# Patient Record
Sex: FEMALE | Race: WHITE
[De-identification: names, ages, dates, MRNs, and addresses within clinical notes are randomized per-mention and may not be internally consistent; named-entity substitution may affect disease eponyms.]

---

## 2018-10-08 ENCOUNTER — HOSPITAL ENCOUNTER (OUTPATIENT)
Dept: HOSPITAL 84 - D.LDO | Age: 23
Discharge: HOME | End: 2018-10-08
Attending: OBSTETRICS & GYNECOLOGY
Payer: MEDICAID

## 2018-10-08 VITALS — BODY MASS INDEX: 39.7 KG/M2

## 2018-10-08 DIAGNOSIS — Z3A.24: ICD-10-CM

## 2018-10-08 DIAGNOSIS — O21.9: Primary | ICD-10-CM

## 2018-10-08 LAB
APPEARANCE UR: CLEAR
BILIRUB SERPL-MCNC: NEGATIVE MG/DL
COLOR UR: YELLOW
GLUCOSE SERPL-MCNC: NEGATIVE MG/DL
KETONES UR STRIP-MCNC: NEGATIVE MG/DL
NITRITE UR-MCNC: NEGATIVE MG/ML
PH UR STRIP: 7 [PH] (ref 5–6)
PROT UR-MCNC: NEGATIVE MG/DL
SP GR UR STRIP: 1.01 (ref 1–1.02)
UROBILINOGEN UR-MCNC: NORMAL MG/DL

## 2019-01-14 ENCOUNTER — HOSPITAL ENCOUNTER (INPATIENT)
Dept: HOSPITAL 84 - D.LD | Age: 24
LOS: 2 days | Discharge: HOME | End: 2019-01-16
Attending: OBSTETRICS & GYNECOLOGY | Admitting: OBSTETRICS & GYNECOLOGY
Payer: COMMERCIAL

## 2019-01-14 VITALS — SYSTOLIC BLOOD PRESSURE: 111 MMHG | DIASTOLIC BLOOD PRESSURE: 79 MMHG

## 2019-01-14 VITALS — DIASTOLIC BLOOD PRESSURE: 57 MMHG | SYSTOLIC BLOOD PRESSURE: 98 MMHG

## 2019-01-14 VITALS
BODY MASS INDEX: 41.62 KG/M2 | WEIGHT: 220.46 LBS | HEIGHT: 61 IN | HEIGHT: 61 IN | BODY MASS INDEX: 41.62 KG/M2 | WEIGHT: 220.46 LBS

## 2019-01-14 VITALS — SYSTOLIC BLOOD PRESSURE: 115 MMHG | DIASTOLIC BLOOD PRESSURE: 70 MMHG

## 2019-01-14 VITALS — DIASTOLIC BLOOD PRESSURE: 63 MMHG | SYSTOLIC BLOOD PRESSURE: 113 MMHG

## 2019-01-14 VITALS — DIASTOLIC BLOOD PRESSURE: 57 MMHG | SYSTOLIC BLOOD PRESSURE: 109 MMHG

## 2019-01-14 VITALS — SYSTOLIC BLOOD PRESSURE: 115 MMHG | DIASTOLIC BLOOD PRESSURE: 55 MMHG

## 2019-01-14 VITALS — DIASTOLIC BLOOD PRESSURE: 64 MMHG | SYSTOLIC BLOOD PRESSURE: 105 MMHG

## 2019-01-14 VITALS — DIASTOLIC BLOOD PRESSURE: 74 MMHG | SYSTOLIC BLOOD PRESSURE: 117 MMHG

## 2019-01-14 VITALS — DIASTOLIC BLOOD PRESSURE: 75 MMHG | SYSTOLIC BLOOD PRESSURE: 118 MMHG

## 2019-01-14 VITALS — SYSTOLIC BLOOD PRESSURE: 114 MMHG | DIASTOLIC BLOOD PRESSURE: 55 MMHG

## 2019-01-14 VITALS — DIASTOLIC BLOOD PRESSURE: 73 MMHG | SYSTOLIC BLOOD PRESSURE: 115 MMHG

## 2019-01-14 VITALS — DIASTOLIC BLOOD PRESSURE: 65 MMHG | SYSTOLIC BLOOD PRESSURE: 117 MMHG

## 2019-01-14 DIAGNOSIS — J45.909: ICD-10-CM

## 2019-01-14 DIAGNOSIS — Z3A.39: ICD-10-CM

## 2019-01-14 DIAGNOSIS — O34.211: ICD-10-CM

## 2019-01-14 LAB
APPEARANCE UR: (no result)
BACTERIA #/AREA URNS HPF: (no result) /HPF
BASOPHILS NFR BLD AUTO: 0.1 % (ref 0–2)
BILIRUB SERPL-MCNC: NEGATIVE MG/DL
COLOR UR: YELLOW
EOSINOPHIL NFR BLD: 0.1 % (ref 0–7)
ERYTHROCYTE [DISTWIDTH] IN BLOOD BY AUTOMATED COUNT: 13.4 % (ref 11.5–14.5)
ERYTHROCYTE [DISTWIDTH] IN BLOOD BY AUTOMATED COUNT: 13.7 % (ref 11.5–14.5)
GLUCOSE SERPL-MCNC: NEGATIVE MG/DL
HCT VFR BLD CALC: 33.2 % (ref 36–48)
HCT VFR BLD CALC: 36.3 % (ref 36–48)
HGB BLD-MCNC: 11 G/DL (ref 12–16)
HGB BLD-MCNC: 11.9 G/DL (ref 12–16)
IMM GRANULOCYTES NFR BLD: 0.3 % (ref 0–5)
KETONES UR STRIP-MCNC: NEGATIVE MG/DL
LYMPHOCYTES NFR BLD AUTO: 9.1 % (ref 15–50)
MCH RBC QN AUTO: 27 PG (ref 26–34)
MCH RBC QN AUTO: 27.2 PG (ref 26–34)
MCHC RBC AUTO-ENTMCNC: 32.8 G/DL (ref 31–37)
MCHC RBC AUTO-ENTMCNC: 33.1 G/DL (ref 31–37)
MCV RBC: 82.2 FL (ref 80–100)
MCV RBC: 82.5 FL (ref 80–100)
MONOCYTES NFR BLD: 4.6 % (ref 2–11)
MUCOUS THREADS #/AREA URNS LPF: (no result) /LPF
NEUTROPHILS NFR BLD AUTO: 85.8 % (ref 40–80)
NITRITE UR-MCNC: NEGATIVE MG/ML
PH UR STRIP: 6 [PH] (ref 5–6)
PLATELET # BLD: 210 10X3/UL (ref 130–400)
PLATELET # BLD: 237 10X3/UL (ref 130–400)
PMV BLD AUTO: 10.3 FL (ref 7.4–10.4)
PMV BLD AUTO: 10.7 FL (ref 7.4–10.4)
PROT UR-MCNC: NEGATIVE MG/DL
RBC # BLD AUTO: 4.04 10X6/UL (ref 4–5.4)
RBC # BLD AUTO: 4.4 10X6/UL (ref 4–5.4)
SP GR UR STRIP: 1.01 (ref 1–1.02)
SQUAMOUS #/AREA URNS HPF: (no result) /HPF (ref 0–5)
UROBILINOGEN UR-MCNC: NORMAL MG/DL
WBC # BLD AUTO: 10.9 10X3/UL (ref 4.8–10.8)
WBC # BLD AUTO: 15.7 10X3/UL (ref 4.8–10.8)
WBC #/AREA URNS HPF: (no result) /HPF (ref 0–5)

## 2019-01-14 PROCEDURE — 0TNB0ZZ RELEASE BLADDER, OPEN APPROACH: ICD-10-PCS | Performed by: OBSTETRICS & GYNECOLOGY

## 2019-01-14 PROCEDURE — 0UB70ZZ EXCISION OF BILATERAL FALLOPIAN TUBES, OPEN APPROACH: ICD-10-PCS | Performed by: OBSTETRICS & GYNECOLOGY

## 2019-01-14 NOTE — NUR
FUNDUS FIRMS IMMEDIATELY WITH MASSAGE, ML, U/U. SMALL LOCHIA RUBRA TO PAD. PT
BREASTFEEDING INFANT AT THIS TIME. FAMILY AT BEDSIDE. SRUx2, CL IN REACH.

## 2019-01-14 NOTE — NUR
PT RECEIVED FROM RECOVERY VIA BED TO ROOM 1274. PT AAOx3, RATING PAIN APPROX
5/10 AT THIS TIME. FUNDUS FIRMS WITH MASSAGE, ML, U/1. SMALL RUBRA LOCHIA, NO
CLOTS. ABD DRESSING OVER LT C/S INCISION IS C/D/I. NO DRAINAGE NOTED. ICE PACK
TO INCISION. ICE CHIPS PROVIDED TO PT PER REQUEST. WILL ADMIN TORADOL AND SET
UP PCA AS ORDERED.

## 2019-01-14 NOTE — NUR
THIS RN TO ROOM FOR PT CHECK. PT REQUESTS ICE CHIPS, ICE CHIPS GIVEN. PT
VISITING WITH FAMILY MEMBER. LIGHTS IN ROOM DIM. PT DENIES FURTHER NEEDS.
SRUx2, CL AND PCA BUTTON IN REACH.

## 2019-01-14 NOTE — NUR
PT VISITING WITH FRIENDS AND FAMILY, RATES PAIN AT 3/10, NEW BAG OF NS WITH
20UNITS OF PITOCIN INFUSING VIA PUMP PER ORDERS.

## 2019-01-14 NOTE — NUR
FUNDUS IMMEDIATELY FIRMS WITH GENTLE MASSAGE, ML, U/1. SMALL RUBRA LOCHIA, 1
NICKEL-SIZED CLOT EXPELLED WITH MASSAGE. PERICARE DONE, PADS CHANGED. PT DAVIDSON
WELL. PT POSITIONED TO LEFT TILT. SPOUSE AT BEDSIDE. SRUx2, CL AND PCA IN
REACH.

## 2019-01-14 NOTE — NUR
ASSISTED WITH BRA CHANGE PER REQUEST, VSS, AFEBRILE, RESP EVEN AND UNLABORED,
PIV INFUSING TO RIGHT FA WITHOUT DIFFICULTY, PERICARE/PATEL CARE COMPLETED,
PERIPAD CHANGED, MODERATE LOCHIA RUBRA, FUNDUS FIRM AT U/1 AND MIDLINE, PATEL
WITH 200ML URINE EMPTIED FROM GRAVITY COLLECTION CHAMBER, TOLERATING PO
FLUIDS, DENIES OTHER NEEDS AT THIS TIME, CONTINUE TO MONITOR.  CALL LIGHT IN
EASY REACH, SPOUSE AT BS AND SUPPORTIVE OF PT.

## 2019-01-14 NOTE — NUR
PAIN REASSESSMENT COMPLETED, PT C/O PAIN NOW A 2 OUT OF 10 ON NUMERIC PAIN
SCALE, USING PCA PRN, DENIES OTHER NEEDS AT THIS TIME, CONTINUE TO MONITOR.

## 2019-01-14 NOTE — NUR
TORADOL ADMIN IVP AT 1303, DILAUDID PCA SETUP AT 1309 AS ORDERED, SEE EMAR FOR
DOC. PT AND FAMILY GIVEN INSTRUCTIONS ON PCA USE AND ONLY PT MAY PRESS PCA
BUTTON. UNDERSTANDING VERBALIZED. PT DENIES NEEDS. WILL REASSESS PAIN.

## 2019-01-14 NOTE — NUR
PT SITTING UP IN BED, COMPLETING PAPERWORK FOR INFANT PER NURSERY REQUEST,
ADDITIONAL LINENS PROVIDED FOR SPOUSE WHO IS STAYING WITH PT TONIGHT.  CALL
LIGHT IN EASY REACH, DENIES NEEDS, CONTINUE TO MONITOR.

## 2019-01-14 NOTE — NUR
THIS RN TO ROOM FOR PT CHECK. PT RATING PAIN 3-4/10 AT THIS TIME. FF, ML, U/U.
SMALL RUBRA LOCHIA, NO CLOTS. PERICARE DONE, PADS AND BED PADS CHANGED. 100ML
CLEAR YELLOW URINE EMPTIED FROM UROMETER. INCENTIVE SPIROMETER TEACHING DONE.
PT USES I.S. x3 WITH GOOD EFFORT, GOOD COUGH x1. AXILLARY TEMP 97.6. PT
PROVIDED WITH SPRITE PER REQUEST. PT DENIES FURTHER NEEDS. FAMILY TO ROOM AT
THIS TIME. SRUx2, CL AND PCA BUTTON IN REACH. WILL CONT TO MONITOR.

## 2019-01-14 NOTE — NUR
THIS RN TO ROOM FOR PT CHECK. FF, ML, U/U. SMALL RUBRA LOCHIA, NO CLOTS. PADS
CHANGED. 40ML CLEAR YELLOW URINE EMPTIED FROM UROMETER. PT ENCOURAGED TO
DRINK. CLEAR LIQUID TRAY PROVIDED. PT DENIES NEEDS AT THIS TIME. SRUx2, CL IN
REACH.

## 2019-01-14 NOTE — NUR
PT DROWSY BUT RESPONSIVE TO VERBAL STIMULI, RESP EVEN AND UNLABORED, LUNGS
CTAB, HEART RRR, ABD SOFT, HYPOACTIVE BOWEL SOUNDS X4 QUADRANTS, DENIES
FLATUS, FUNDUS BOGGY BUT FIRMS EASILY WITH LIGHT FUNDAL MASSAGE AT U/1 AND
MIDLINE, LOCHIA RUBRA MODERATE AMOUNT WITH DIME SIZED CLOT NOTED TO PERIPADS,
PERICARE/PATEL CARE PROVIDED, PATEL TO GRAVITY DRAINAGE WITH CONCENTRATED
CLEAR URINE NOTED TO CHAMBER, EMPTIED AND RESET TO GRAVITY. REPOSITIONED PT TO
COMFORT, HOB ELEVATED 30 DEGREES, PILLOWS PROPPED TO COMFORT, PULSE OXIMETRY
ON AT 98% WHILE AWAKE ON RA, ALTMAN FREELY, NO EDEMA NOTED TO B LE, NEGATIVE
EVON'S SIGN B LE, PEDAL PULSES STRONG AND EQUAL, +2, DEMONSTRATES USE OF
INCENTIVE SPIROMETRY UP TO 1500ML WITH ENCOURAGEMENT, WEAK COUGH ELICITED, C/O
INCISIONAL PAIN AND ABD CRAMPING AT 4 OF 10 ON NUMERIC PAIN SCALE, KETORALAC
30MG SIVP ADMINISTERED TO RIGHT FA PIV WITHOUT DIFFICULTY, PIV BENIGN TO
INSPECTION AT THIS TIME, BONDING NOTED WITH INFANT, SPOUSE AT BS FOR ASSIST,
TOLERATING PO FLUIDS, DENIES NEEDS AT THIS TIME, CONTINUE TO MONITOR.

## 2019-01-15 VITALS — DIASTOLIC BLOOD PRESSURE: 55 MMHG | SYSTOLIC BLOOD PRESSURE: 107 MMHG

## 2019-01-15 VITALS — SYSTOLIC BLOOD PRESSURE: 114 MMHG | DIASTOLIC BLOOD PRESSURE: 52 MMHG

## 2019-01-15 VITALS — SYSTOLIC BLOOD PRESSURE: 115 MMHG | DIASTOLIC BLOOD PRESSURE: 58 MMHG

## 2019-01-15 VITALS — DIASTOLIC BLOOD PRESSURE: 61 MMHG | SYSTOLIC BLOOD PRESSURE: 118 MMHG

## 2019-01-15 VITALS — DIASTOLIC BLOOD PRESSURE: 56 MMHG | SYSTOLIC BLOOD PRESSURE: 95 MMHG

## 2019-01-15 VITALS — SYSTOLIC BLOOD PRESSURE: 95 MMHG | DIASTOLIC BLOOD PRESSURE: 52 MMHG

## 2019-01-15 LAB
BASOPHILS NFR BLD AUTO: 0.1 % (ref 0–2)
EOSINOPHIL NFR BLD: 0.6 % (ref 0–7)
ERYTHROCYTE [DISTWIDTH] IN BLOOD BY AUTOMATED COUNT: 13.7 % (ref 11.5–14.5)
HCT VFR BLD CALC: 28.2 % (ref 36–48)
HGB BLD-MCNC: 9.2 G/DL (ref 12–16)
IMM GRANULOCYTES NFR BLD: 0.2 % (ref 0–5)
LYMPHOCYTES NFR BLD AUTO: 18.4 % (ref 15–50)
MCH RBC QN AUTO: 27 PG (ref 26–34)
MCHC RBC AUTO-ENTMCNC: 32.6 G/DL (ref 31–37)
MCV RBC: 82.7 FL (ref 80–100)
MONOCYTES NFR BLD: 6.9 % (ref 2–11)
NEUTROPHILS NFR BLD AUTO: 73.8 % (ref 40–80)
PLATELET # BLD: 166 10X3/UL (ref 130–400)
PMV BLD AUTO: 10.5 FL (ref 7.4–10.4)
RBC # BLD AUTO: 3.41 10X6/UL (ref 4–5.4)
WBC # BLD AUTO: 8.4 10X3/UL (ref 4.8–10.8)

## 2019-01-15 NOTE — NUR
PAIN REASSESSMENT COMPLETED, PT RATES PAIN 2-3 ON NUMERIC PAIN SCALE, DROWSY,
RIGHT LATERAL POSITION, SIDE RAILS UP X2, BED IN LOW POSITION, SPOUSE HOLDING
INFANT IN ARMS, CONTINUE TO MONITOR.

## 2019-01-15 NOTE — NUR
ROUNDS COMPLETED, PT SITTING UP IN BED, BREASTFEEDING INFANT, CUP OF ICE
PROVIDED UPON REQUEST, SPOUSE SITTING AT END OF BED PROVIDING ASSIST PRN TO
PT.  CALL LIGHT IN EASY REACH, DENIES OTHER NEEDS OR CONCERNS.

## 2019-01-15 NOTE — NUR
PT ASSESSMENT COMPLETED, VSS, AFEBRILE, RESP EVEN AND UNLABORED, LUNGS CTAB,
HEART RRR, ABD SOFT AND NONTENDER, BOWEL SOUNDS ACTIVE X4 QUADS, REPORTS +
FLATUS, NO BM, VOIDING WITHOUT DIFFICULTY, FUNDUS FIRM AT U/2 AND MIDLINE,
LOCHIA RUBRA MODERATE AMOUNT, DENIES CLOTS, SELF PERFORMING PERICARE POST
VOIDS PER PT REPORT, ALTMAN FREELY, 1+ EDEMA BLE BUT WITH PEDAL PULSES 2+/= B LE,
NEGATIVE EVON'S SIGN B LE, SALINE LOCK TO RIGHT FA BENIGN TO INSPECTION, PT
REPORTS AMBULATING IN HALLS EARLIER, SPOUSE REMAINS AT BS.  CALL LIGHT IN EASY
REACH, BED IN LOW POSITION, BRAKES LOCKED ON BED, SIDE RAILS UP X2, WHITEBOARD
UPDATED.  PT RATES PAIN 3 OF 10 ON NUMERIC PAIN SCALE, PLAN OF CARE FOR THIS
PM REVIEWED, PT STATES UNDERSTANDING.  CONTINUE TO MONITOR.

## 2019-01-15 NOTE — NUR
ROUNDS COMPLETED, VSS, AFEBRILE, PT SITTING UP IN BED, USING BREAST PUMP,
INFANT SWADDLED AND IN BED WITH PT.  RESP EVEN AND UNLABORED, DENIES NEEDS OR
CONCERNS. CALL LIGHT IN EASY REACH, CONTINUE TO MONITOR.

## 2019-01-15 NOTE — NUR
PT IV CONVERTED TO SALINE LOCK, PCA DC'D.  INFANT TO MATERNAL BREAST, NAD
NOTED.  CONTINUE TO MONITOR.

## 2019-01-15 NOTE — NUR
AM ROUNDS COMPLETED, VSS, AFEBRILE, FUNDUS FIRM AT U/1 AND MIDLINE,
PATEL/PERICARE PROVIDED, CHUX/PERIPAD CHANGED, EMPTIED 400ML URINE FROM
COLLECTION CHAMBER AND RESET TO GRAVITY DRAINAGE, RESP EVEN AND UNLABORED, LTI
DRESSING REMAINS CDI. EXTRA PILLOW PROVIDED UPON REQUEST, DENIES OTHER NEEDS,
RATES PAIN 1 OR 2 AT THIS TIME ON NUMERIC PAIN SCALE.  CONTINUE TO MONITOR.

## 2019-01-15 NOTE — NUR
PT ROUNDS COMPLETED, INFANT IN ARMS, NO DISTRESS NOTED, DENIES NEEDS.
CONTINUE TO MONITOR.  CALL LIGHT IN EASY REACH.

## 2019-01-15 NOTE — NUR
PTS  COMES TO DESK AND REORTS THAT PT REQUESTING THAT NURSE CALL MD AND
REQUEST ALTERNATE PAIN MEDICATION BECAUSE NORCO IS NOT WORKING FOR PAIN
CONTROL DUE TO THE EXTREME ITCHING IT IS CAUSING. PT IS REQUESTING THAT MD
PRESCRIBE PERCOCET BECAUSE THAT IS WHAT SHE USED WITH PREVIOUS C/S IN THE PAST
AND HAD GOOD RESULTS WITH IT. DR. BROWN NOTIFIED OF THIS. NEW ORDERS RCVD.

## 2019-01-15 NOTE — NUR
C/O INCISIONAL AND ABD PAIN RATES 3 OF 10 ON NUMERIC PAIN SCALE, PRN KETORALAC
GIVEN RIGHT FA PIV WITHOUT DIFFICULTY.  DENIES OTHER NEEDS AT THIS TIME.
CONTINUE TO MONITOR.  USING BREAST PUMP WITHOUT PROBLEMS.

## 2019-01-15 NOTE — NUR
PT RESTING WITH EYES CLOSED, RESP EVEN AND UNLABORED, NAD NOTED.  INFANT EYES
CLOSED, IN ROLLING CRIB ADJACENT TO BED, NAD NOTED.  CONTINUE TO MONITOR.

## 2019-01-15 NOTE — NUR
PATEL DC'D, EMPTIED 700ML URINE FROM COLLECTION CHAMBER, PERICARE/PATEL CARE
COMPLETED, PERIPAD PLACED OVER LTI WITH STAPLES, NO DRAINAGE, REDNESS, WARMTH
NOTED TO INCISION.

## 2019-01-15 NOTE — NUR
PM MEDS GIVEN, C/O PAIN 4-5 ON NUMERIC PAIN SCALE, REQUESTS PAIN MEDS; SAME
PROVIDED.  HS SNACK TRAY GIVEN ALONG WITH CUP OF ICE AND SODA PER REQUEST, NO
OTHER NEEDS AT THIS TIME, RESP EVEN AND UNLABORED, CALL LIGHT IN EASY REACH,
CONTINUE TO MONITOR.

## 2019-01-15 NOTE — NUR
PT C/O ITCHING AFTER NORCO USE. REPORTING PAIN BUT DOES NOT WANT TO TAKE NORCO
DUE TO ITCHING. REQUEST NURSE CALL MD TO REPORT. INSTRUCTED PT THAT SHE COULD
TRY HALF THE DOSE OF NORCO AND THIS WOULD PROBABLY MINIMIZE THE ITCHING
ASSOCIATED WITH USE. PT STILL WISHES TO NOTIFY MD AND REQUEST CHANGE OR
ADDITIONAL MEDICATION.

## 2019-01-15 NOTE — NUR
PT ASSISTED OOB TO BATHROOM. ABMULATED WITH SLOW STEADY GAIT WITH S/O AND RN
AT SIDE. VOIDED 500 ML CLEAR YELLOW URINE WITHOUT DIFFICULTY. WES CARE
PERFORMED. MESH PANTIES AND WES PADS GIVEN AND EXPLAINED. PT AMBULATED BACK
TO BED. UNDERPADS ON BED CHANGED PRIOR TO PT GETTING INTO BED. PT TOLERATED
WELL.

## 2019-01-16 VITALS — SYSTOLIC BLOOD PRESSURE: 114 MMHG | DIASTOLIC BLOOD PRESSURE: 73 MMHG

## 2019-01-16 NOTE — NUR
ROUNDS COMPLETED, PT SITTING IN BED HOLDING INFANT IN ARMS, DENIES NEEDS,
CONCERNS, OR C/O PAIN AT THIS TIME, CONTINUE TO MONITOR.

## 2019-01-16 NOTE — NUR
pt calls out with request for pain med, rates her pain at 5/10 and request
both Motrin and Percocet togather if possible.  Meds given as seen on emar.

## 2019-01-16 NOTE — NUR
PT C/O INCISIONAL PAIN AND ABD CRAMPING RATES 5 OR 6 ON NUMERIC PAIN SCALE,
PRN PAIN MED ADMINISTERED WITH SIPS OF WATER AS REQUESTED, DENIES OTHER NEEDS
OR CONCERNS.  CALL LIGHT IN EASY REACH, CONTINUE TO MONITOR.

## 2019-01-16 NOTE — NUR
Julieth Simms
[birth year]
LE@ 8:45
 
 
S: Patient states she is going both formula and breastfeeding. She has been
pumping when infant won't latch due to baby being so sleep. Has been pumping
every 2 hours. She did breastfeed with her other two kids and feels like baby
does a better job with latching then all her other children. Denies pain with
latching or pumping. Denies questions or concerns.
 
 
O: Patient sitting up in bed holding infant. Congratulated on deliver. Praised
for latching and pumping for infant. Explained breastfeeding take time,
practice, and patience. Explained breastfeeding position, how to verify infant
is latched correctly, benefits of skin to skin and the importance of
practicing responsive feeding. Provided tips on how to wake a sleeping baby
and explained normal feeding patterns for an exclusively  infant.
Explained how to help and prevent engorgement. Asked if any questions or
concerns? Provided work cell number for any questions relating to
breastfeeding. Please let nursery staff know if you need any help with
breastfeeding.
 
A: Patient breastfeeding and providing formula due to infant hard to wake when
sleeping.
 
P: Continue to promote  breastfeeding during hospital visit.
 
Suraj Tay, CLC

## 2019-01-16 NOTE — NUR
ROUNDS COMPLETED, PT RESTING SUPINE WITH HOB ELEVATED 30 DEGREES, EYES CLOSED,
RESP EVEN AND UNLABORED, SIDE RAILS UP X2, BED IN LOW POSITION, BRAKES LOCKED,
CALL LIGHT IN EASY REACH, NAD NOTED.  CONTINUE TO MONITOR.

## 2019-01-16 NOTE — NUR
ROUNDS COMPLETED,PT SITTING UP IN BED AT 45 DEGREES, EYES CLOSED, RESP EVEN
AND UNLABORED, NAD NOTED, NO PHYSICAL S/SX PAIN NOTED.  CALL LIGHT IN EASY
REACH, CONTINUE TO MONITOR.

## 2019-01-16 NOTE — NUR
VERBAL AND WRITTEN D/C INSTRUCTIONS GONE OVER WITH WRITTEN SCRIPT FOR PERCOCET
5/325MG AND MOTRIN 600MG.  SHE STATES HER UNDERSTANDING TO ALL INFO GIVEN AND
WILL CALL WHEN INFANT HAS BEEN DISCHARGED.

## 2019-01-16 NOTE — NUR
PT CALLS OUT THAT SHE IS READY TO GO.  DENIES WHEELCHAIR, AMB OFF UNIT WITH
INFANT SECURED IN CARRIER.  HOME WITH SPOUSE.

## 2019-01-16 NOTE — NUR
AM ASSESSMENT COMPLETED,  PT RATES PAIN AT 1/10 AND STATES SHE WILL CALL WHEN
READY FOR PAIN MED.  SALINE LOCK REMOVED FROM RIGHT FOREARM WITH CATH INTACT.
PT UNDERSTANDS THAT SHE HAS A DICHARGE ORDER AND WAS WAITING FOR PEDI TO MAKE
ROUNDS ON INFANT.

## 2019-02-01 NOTE — OP
PATIENT NAME:  JOE LECHUGA                         MEDICAL RECORD: [id number]
:89                                             LOCATION:[id number]
                                                         ADMISSION DATE:19
SURGEON:  ALY HOUSER MD        
 
 
DATE OF OPERATION:  2019
 
PREOPERATIVE DIAGNOSES:
1.  Term intrauterine pregnancy at 39 weeks.
2.  History of previous  section.
3.  Multiparity, the patient desires permanent sterility.
 
POSTOPERATIVE DIAGNOSES:
1.  Term intrauterine pregnancy at 39 weeks.
2.  History of previous  section.
3.  Multiparity, the patient desires permanent sterility.
4.  Extensive adhesive disease involving the omentum, lower uterine segment, and
bladder.
 
SURGEON:  Aly Houser MD
 
PROCEDURE:  Repeat low transverse  section, lysis of adhesions and
partial salpingectomy via modified Uchida procedure.
 
ANESTHESIA:  Regional via spinal.
 
INTRAVENOUS FLUIDS:  Per anesthesia record.
 
ESTIMATED BLOOD LOSS:  1000 cc.
 
FINDINGS:
1.  Viable female infant, Apgars 9 at 1 and 9 at 5.
2.  Placenta delivered manually intact, 3-vessel cord noted.
3.  Extensive adhesive disease involving the omentum, lower uterine segment, and
bladder.
 
SPECIMENS:  Placenta, cord for gases, and bilateral fallopian tube segments.
 
DESCRIPTION OF PROCEDURE:  The patient was taken to the operating room where
regional anesthesia was achieved without difficulty via spinal.  The patient was
then prepped and draped in normal sterile fashion in the dorsal supine position.
 SCDs were on and functioning normally.  A Barrett catheter had been placed and
was draining freely.  Following prep and drape, a repeat Pfannenstiel skin
incision was made, extended downward to the underlying subcutaneous fat to level
of fascia.  The fascia was then excised in the midline using the scalpel,
extended bilaterally using the Sevilla scissors.  The fascial incision was then
grasped with Kocher clamps times 2, tented upward, and sharply dissected from
underlying rectus muscle using the masses and the Bovie cautery.  Rectus muscles
were then  bluntly in the midline and the peritoneum was entered
sharply at the superior aspect of the incision.  A very small window was noted
and digital palpation revealed extensive adhesions of the omentum.  The
peritoneal incision was then carefully dissected downward until the omentum
could be better identified.  This was clamped and cut using a 2-0 Vicryl times 2
for each pedicle.  Multiple areas of omentum were dissected and then clamped
adjacent to the rectus fascia, cut and then suture ligated using 2-0 Vicryl. 
The lower uterine segment was then cleared of all omental adhesions and a
bladder flap was created by excising the anterior leaf of the broad ligament
 
 
 
OPERATIVE REPORT                               [id number]    ANKUSHJOE       
 
 
across the lower uterine segment.  A bladder flap was developed and a bladder
blade was placed into the pelvis.  A low transverse incision was made and
extended using the Pelosi method.  The infant's head was delivered
atraumatically followed by the body.  The infant was bulb suctioned upon
delivery.  The infant was handed to awaiting nursery team.  Cord was obtained
for gases.  The placenta was removed manually intact, 3-vessel cord was noted. 
Uterus was exteriorized, cleared of all clots and debris.  The uterine incision
was then repaired with 0 Vicryl in a running locked fashion times 2 and the
posterior cul-de-sac was then thoroughly irrigated.  Attention was then turned
to fallopian tubes.  The fimbriated ends of the tubes, especially the left was
found to be densely adherent to the uterine blood supply.  Decision at that time
was made to proceed with a modified Uchida procedure.  The mid portion of the
tube was identified and a defect was made in the mesosalpinx using the Bovie
cautery.  A curved Jailene clamps were then placed across the fallopian tube
bilaterally into the broad ligament defect.  The intervening piece of fallopian
tube was then removed bilaterally with good hemostasis noted.  The clamps were
then suture ligated using 2-0 Vicryl times 2.  Good hemostasis was noted
bilaterally.  Uterus was then replaced into the pelvis and the anterior
cul-de-sac was then thoroughly irrigated.  Good hemostasis was noted from the
low transverse incision.  Counts were correct times 2 for sponges, needles, and
instruments.  The fascia was repaired with 0 loop PDS times 1 and the skin
repaired with Monocryl in a running unlocked fashion and then Dermabond was
placed on the incision.  The patient tolerated the procedure well, was
transferred to postanesthesia recovery stable without incident.
 
TRANSINT:[id number] Voice Confirmation ID: [id number] DOCUMENT ID: [id number]
                                           
                                           ALY HOUSER MD        
 
 
 
Electronically Signed by ALY HOUSER on 19 at 1209
 
 
 
 
 
 
 
 
 
 
 
 
 
 
 
CC:                                                             [id number]
DICTATION DATE: 19 170     :     19 2331      DIS IN  
                                                                      19
[facility]                                          
 [place] [address]